# Patient Record
(demographics unavailable — no encounter records)

---

## 2025-05-27 NOTE — DISCUSSION/SUMMARY
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

## 2025-05-27 NOTE — HISTORY OF PRESENT ILLNESS
[FreeTextEntry1] : 19 y/o P0 Hrere for renewal of OCP/annual exam, helps with controling menses, + gas pain with menses  Gender dysphoria, ok, not planning to transition at this timenot in a relationship Declined physical exam today  . . . . . . . . . . . . . . . . . . . . . . . . . . . . . . . . . . . . . . . . . . . . . . . . . . . . . . . . . . . . . . . . . . . . .